# Patient Record
Sex: MALE | Race: WHITE | NOT HISPANIC OR LATINO | Employment: OTHER | URBAN - METROPOLITAN AREA
[De-identification: names, ages, dates, MRNs, and addresses within clinical notes are randomized per-mention and may not be internally consistent; named-entity substitution may affect disease eponyms.]

---

## 2021-01-10 DIAGNOSIS — Z11.59 ENCOUNTER FOR SCREENING FOR OTHER VIRAL DISEASES: Primary | ICD-10-CM

## 2021-01-29 DIAGNOSIS — Z11.59 ENCOUNTER FOR SCREENING FOR OTHER VIRAL DISEASES: ICD-10-CM

## 2021-01-29 LAB
SARS-COV-2 RNA RESP QL NAA+PROBE: NORMAL
SPECIMEN SOURCE: NORMAL

## 2021-01-29 PROCEDURE — U0005 INFEC AGEN DETEC AMPLI PROBE: HCPCS | Performed by: ORTHOPAEDIC SURGERY

## 2021-01-29 PROCEDURE — U0003 INFECTIOUS AGENT DETECTION BY NUCLEIC ACID (DNA OR RNA); SEVERE ACUTE RESPIRATORY SYNDROME CORONAVIRUS 2 (SARS-COV-2) (CORONAVIRUS DISEASE [COVID-19]), AMPLIFIED PROBE TECHNIQUE, MAKING USE OF HIGH THROUGHPUT TECHNOLOGIES AS DESCRIBED BY CMS-2020-01-R: HCPCS | Performed by: ORTHOPAEDIC SURGERY

## 2021-01-29 RX ORDER — ESCITALOPRAM OXALATE 10 MG/1
10 TABLET ORAL EVERY MORNING
COMMUNITY

## 2021-01-29 RX ORDER — HYDROXYZINE HYDROCHLORIDE 25 MG/1
25-50 TABLET, FILM COATED ORAL 2 TIMES DAILY PRN
COMMUNITY
End: 2021-01-29

## 2021-01-29 RX ORDER — LORAZEPAM 1 MG/1
1 TABLET ORAL
COMMUNITY

## 2021-01-29 RX ORDER — BETAMETHASONE DIPROPIONATE 0.5 MG/G
LOTION TOPICAL 2 TIMES DAILY PRN
COMMUNITY

## 2021-01-29 RX ORDER — PHENOL 1.4 %
10 AEROSOL, SPRAY (ML) MUCOUS MEMBRANE AT BEDTIME
COMMUNITY

## 2021-01-29 RX ORDER — SILDENAFIL CITRATE 20 MG/1
60-100 TABLET ORAL DAILY PRN
COMMUNITY

## 2021-01-29 RX ORDER — HYDROCORTISONE 2.5 %
CREAM (GRAM) TOPICAL 2 TIMES DAILY PRN
COMMUNITY

## 2021-01-29 RX ORDER — BUDESONIDE 3 MG/1
9 CAPSULE, COATED PELLETS ORAL EVERY MORNING
COMMUNITY

## 2021-01-29 RX ORDER — CLOTRIMAZOLE 1 G/ML
SOLUTION TOPICAL 2 TIMES DAILY PRN
Status: ON HOLD | COMMUNITY
End: 2021-02-01

## 2021-01-29 RX ORDER — ROSUVASTATIN CALCIUM 40 MG/1
40 TABLET, COATED ORAL AT BEDTIME
COMMUNITY

## 2021-01-29 RX ORDER — TRAZODONE HYDROCHLORIDE 50 MG/1
50 TABLET, FILM COATED ORAL
COMMUNITY
End: 2021-01-29

## 2021-01-29 RX ORDER — VENLAFAXINE HYDROCHLORIDE 37.5 MG/1
75 CAPSULE, EXTENDED RELEASE ORAL
COMMUNITY

## 2021-01-29 RX ORDER — METRONIDAZOLE 7.5 MG/G
GEL TOPICAL 2 TIMES DAILY PRN
COMMUNITY

## 2021-01-29 RX ORDER — NITROGLYCERIN 0.4 MG/1
0.4 TABLET SUBLINGUAL DAILY PRN
COMMUNITY

## 2021-01-29 NOTE — PROGRESS NOTES
PTA medications updated by Medication Scribe prior to surgery via phone call with patient      -LAST DOSES ENTERED BY NURSE-    Comments:    Medication history sources: Patient and H&P  Medication history source reliability: Good  Adherence assessment: N/A Not Observed    Significant changes made to the medication list:  Patient reports no longer taking the following meds (med scribe removed from PTA med list): hydroxyzine, Trazodone      Additional medication history information:   Patient brought own home meds: Diprosone 0.05% lotion, hydrocortisone 2.5% cream, Metronidazole 0.75% gel        Prior to Admission medications    Medication Sig Last Dose Taking? Auth Provider   betamethasone dipropionate (DIPROSONE) 0.05 % external lotion Apply topically 2 times daily as needed (to face.)   at PRN Yes Reported, Patient   budesonide (ENTOCORT EC) 3 MG EC capsule Take 9 mg by mouth every morning (3 X 3 mg)  at AM Yes Reported, Patient   clotrimazole (LOTRIMIN) 1 % external solution Apply topically 2 times daily as needed   at PRN Yes Reported, Patient   escitalopram (LEXAPRO) 10 MG tablet Take 10 mg by mouth every morning  at AM Yes Reported, Patient   hydrocortisone 2.5 % cream Apply topically 2 times daily as needed for itching (to face)   at PRN Yes Reported, Patient   LORazepam (ATIVAN) 1 MG tablet Take 1 mg by mouth 2 times daily   at AM Yes Reported, Patient   Melatonin 10 MG TABS tablet Take 10 mg by mouth At Bedtime   at HS Yes Reported, Patient   metroNIDAZOLE (METROGEL) 0.75 % external gel Apply topically 2 times daily as needed (to face)   at PRN Yes Reported, Patient   nitroGLYcerin (NITROSTAT) 0.4 MG sublingual tablet Place 0.4 mg under the tongue daily as needed for chest pain For chest pain place 1 tablet under the tongue every 5 minutes for 3 doses. If symptoms persist 5 minutes after 1st dose call 911.  at PRN Yes Reported, Patient   rosuvastatin (CRESTOR) 40 MG tablet Take 40 mg by mouth At Bedtime  at  HS Yes Reported, Patient   sildenafil (REVATIO) 20 MG tablet Take  mg by mouth daily as needed  at PRN Yes Reported, Patient   venlafaxine (EFFEXOR-XR) 37.5 MG 24 hr capsule Take 75 mg by mouth 2 times daily (2 X 37.5 mg)   at AM Yes Reported, Patient

## 2021-01-30 LAB
LABORATORY COMMENT REPORT: NORMAL
SARS-COV-2 RNA RESP QL NAA+PROBE: NEGATIVE
SPECIMEN SOURCE: NORMAL

## 2021-02-01 ENCOUNTER — ANESTHESIA EVENT (OUTPATIENT)
Dept: SURGERY | Facility: CLINIC | Age: 76
End: 2021-02-01
Payer: MEDICARE

## 2021-02-01 ENCOUNTER — APPOINTMENT (OUTPATIENT)
Dept: GENERAL RADIOLOGY | Facility: CLINIC | Age: 76
End: 2021-02-01
Attending: ORTHOPAEDIC SURGERY
Payer: MEDICARE

## 2021-02-01 ENCOUNTER — ANESTHESIA (OUTPATIENT)
Dept: SURGERY | Facility: CLINIC | Age: 76
End: 2021-02-01
Payer: MEDICARE

## 2021-02-01 ENCOUNTER — HOSPITAL ENCOUNTER (OUTPATIENT)
Facility: CLINIC | Age: 76
Discharge: HOME | End: 2021-02-02
Attending: ORTHOPAEDIC SURGERY | Admitting: ORTHOPAEDIC SURGERY
Payer: MEDICARE

## 2021-02-01 DIAGNOSIS — Z96.662 STATUS POST LEFT ANKLE JOINT REPLACEMENT: Primary | ICD-10-CM

## 2021-02-01 PROBLEM — Z96.669 S/P ANKLE JOINT REPLACEMENT: Status: ACTIVE | Noted: 2021-02-01

## 2021-02-01 LAB
CREAT SERPL-MCNC: 0.79 MG/DL (ref 0.66–1.25)
GFR SERPL CREATININE-BSD FRML MDRD: 87 ML/MIN/{1.73_M2}

## 2021-02-01 PROCEDURE — 250N000011 HC RX IP 250 OP 636: Performed by: ANESTHESIOLOGY

## 2021-02-01 PROCEDURE — 999N000141 HC STATISTIC PRE-PROCEDURE NURSING ASSESSMENT: Performed by: ORTHOPAEDIC SURGERY

## 2021-02-01 PROCEDURE — 258N000003 HC RX IP 258 OP 636: Performed by: PHYSICIAN ASSISTANT

## 2021-02-01 PROCEDURE — 250N000011 HC RX IP 250 OP 636: Performed by: PHYSICIAN ASSISTANT

## 2021-02-01 PROCEDURE — C1776 JOINT DEVICE (IMPLANTABLE): HCPCS | Performed by: ORTHOPAEDIC SURGERY

## 2021-02-01 PROCEDURE — 258N000003 HC RX IP 258 OP 636: Performed by: NURSE ANESTHETIST, CERTIFIED REGISTERED

## 2021-02-01 PROCEDURE — 360N000084 HC SURGERY LEVEL 4 W/ FLUORO, PER MIN: Performed by: ORTHOPAEDIC SURGERY

## 2021-02-01 PROCEDURE — 250N000009 HC RX 250: Performed by: NURSE ANESTHETIST, CERTIFIED REGISTERED

## 2021-02-01 PROCEDURE — C1713 ANCHOR/SCREW BN/BN,TIS/BN: HCPCS | Performed by: ORTHOPAEDIC SURGERY

## 2021-02-01 PROCEDURE — 271N000001 HC OR GENERAL SUPPLY NON-STERILE: Performed by: ORTHOPAEDIC SURGERY

## 2021-02-01 PROCEDURE — 250N000013 HC RX MED GY IP 250 OP 250 PS 637: Mod: GY | Performed by: PHYSICIAN ASSISTANT

## 2021-02-01 PROCEDURE — 250N000025 HC SEVOFLURANE, PER MIN: Performed by: ORTHOPAEDIC SURGERY

## 2021-02-01 PROCEDURE — 272N000001 HC OR GENERAL SUPPLY STERILE: Performed by: ORTHOPAEDIC SURGERY

## 2021-02-01 PROCEDURE — 258N000003 HC RX IP 258 OP 636: Performed by: ANESTHESIOLOGY

## 2021-02-01 PROCEDURE — 999N000179 XR SURGERY CARM FLUORO LESS THAN 5 MIN W STILLS

## 2021-02-01 PROCEDURE — 82565 ASSAY OF CREATININE: CPT | Performed by: ORTHOPAEDIC SURGERY

## 2021-02-01 PROCEDURE — 370N000017 HC ANESTHESIA TECHNICAL FEE, PER MIN: Performed by: ORTHOPAEDIC SURGERY

## 2021-02-01 PROCEDURE — 36415 COLL VENOUS BLD VENIPUNCTURE: CPT | Performed by: ORTHOPAEDIC SURGERY

## 2021-02-01 PROCEDURE — 250N000013 HC RX MED GY IP 250 OP 250 PS 637: Performed by: ORTHOPAEDIC SURGERY

## 2021-02-01 PROCEDURE — 250N000011 HC RX IP 250 OP 636: Performed by: NURSE ANESTHETIST, CERTIFIED REGISTERED

## 2021-02-01 PROCEDURE — 710N000009 HC RECOVERY PHASE 1, LEVEL 1, PER MIN: Performed by: ORTHOPAEDIC SURGERY

## 2021-02-01 PROCEDURE — 250N000009 HC RX 250: Performed by: ORTHOPAEDIC SURGERY

## 2021-02-01 PROCEDURE — 272N000002 HC OR SUPPLY OTHER OPNP: Performed by: ORTHOPAEDIC SURGERY

## 2021-02-01 DEVICE — IMP INSERT TORNIER TIBIAL ANKLE SIZE 2X8MM LT LJU265: Type: IMPLANTABLE DEVICE | Site: ANKLE | Status: FUNCTIONAL

## 2021-02-01 DEVICE — IMP BASEPLATE TIBIAL ANKLE XL SIZE 2 SALTO LJU992T: Type: IMPLANTABLE DEVICE | Site: ANKLE | Status: FUNCTIONAL

## 2021-02-01 DEVICE — IMP COMP TORNIER TALAR ANKLE SIZE 2 LT LJU212: Type: IMPLANTABLE DEVICE | Site: ANKLE | Status: FUNCTIONAL

## 2021-02-01 RX ORDER — CEFAZOLIN SODIUM 1 G/3ML
1 INJECTION, POWDER, FOR SOLUTION INTRAMUSCULAR; INTRAVENOUS SEE ADMIN INSTRUCTIONS
Status: DISCONTINUED | OUTPATIENT
Start: 2021-02-01 | End: 2021-02-01 | Stop reason: DRUGHIGH

## 2021-02-01 RX ORDER — ONDANSETRON 2 MG/ML
INJECTION INTRAMUSCULAR; INTRAVENOUS PRN
Status: DISCONTINUED | OUTPATIENT
Start: 2021-02-01 | End: 2021-02-01

## 2021-02-01 RX ORDER — HYDROCORTISONE 2.5 %
CREAM (GRAM) TOPICAL 2 TIMES DAILY PRN
Status: DISCONTINUED | OUTPATIENT
Start: 2021-02-01 | End: 2021-02-02 | Stop reason: HOSPADM

## 2021-02-01 RX ORDER — MAGNESIUM HYDROXIDE 1200 MG/15ML
LIQUID ORAL PRN
Status: DISCONTINUED | OUTPATIENT
Start: 2021-02-01 | End: 2021-02-01 | Stop reason: HOSPADM

## 2021-02-01 RX ORDER — EPHEDRINE SULFATE 50 MG/ML
INJECTION, SOLUTION INTRAMUSCULAR; INTRAVENOUS; SUBCUTANEOUS PRN
Status: DISCONTINUED | OUTPATIENT
Start: 2021-02-01 | End: 2021-02-01

## 2021-02-01 RX ORDER — LIDOCAINE 40 MG/G
CREAM TOPICAL
Status: DISCONTINUED | OUTPATIENT
Start: 2021-02-01 | End: 2021-02-02 | Stop reason: HOSPADM

## 2021-02-01 RX ORDER — HYDROMORPHONE HYDROCHLORIDE 1 MG/ML
.3-.5 INJECTION, SOLUTION INTRAMUSCULAR; INTRAVENOUS; SUBCUTANEOUS EVERY 10 MIN PRN
Status: DISCONTINUED | OUTPATIENT
Start: 2021-02-01 | End: 2021-02-01

## 2021-02-01 RX ORDER — DEXAMETHASONE SODIUM PHOSPHATE 4 MG/ML
INJECTION, SOLUTION INTRA-ARTICULAR; INTRALESIONAL; INTRAMUSCULAR; INTRAVENOUS; SOFT TISSUE PRN
Status: DISCONTINUED | OUTPATIENT
Start: 2021-02-01 | End: 2021-02-01

## 2021-02-01 RX ORDER — NALOXONE HYDROCHLORIDE 0.4 MG/ML
0.2 INJECTION, SOLUTION INTRAMUSCULAR; INTRAVENOUS; SUBCUTANEOUS
Status: ACTIVE | OUTPATIENT
Start: 2021-02-01 | End: 2021-02-02

## 2021-02-01 RX ORDER — ONDANSETRON 4 MG/1
4 TABLET, ORALLY DISINTEGRATING ORAL EVERY 6 HOURS PRN
Status: DISCONTINUED | OUTPATIENT
Start: 2021-02-01 | End: 2021-02-02 | Stop reason: HOSPADM

## 2021-02-01 RX ORDER — METRONIDAZOLE 7.5 MG/G
GEL TOPICAL 2 TIMES DAILY PRN
Status: DISCONTINUED | OUTPATIENT
Start: 2021-02-01 | End: 2021-02-02 | Stop reason: HOSPADM

## 2021-02-01 RX ORDER — ONDANSETRON 2 MG/ML
4 INJECTION INTRAMUSCULAR; INTRAVENOUS EVERY 6 HOURS PRN
Status: DISCONTINUED | OUTPATIENT
Start: 2021-02-01 | End: 2021-02-02 | Stop reason: HOSPADM

## 2021-02-01 RX ORDER — KETOROLAC TROMETHAMINE 15 MG/ML
15 INJECTION, SOLUTION INTRAMUSCULAR; INTRAVENOUS EVERY 6 HOURS
Status: COMPLETED | OUTPATIENT
Start: 2021-02-01 | End: 2021-02-02

## 2021-02-01 RX ORDER — SODIUM CHLORIDE, SODIUM LACTATE, POTASSIUM CHLORIDE, CALCIUM CHLORIDE 600; 310; 30; 20 MG/100ML; MG/100ML; MG/100ML; MG/100ML
INJECTION, SOLUTION INTRAVENOUS CONTINUOUS
Status: DISCONTINUED | OUTPATIENT
Start: 2021-02-01 | End: 2021-02-02 | Stop reason: HOSPADM

## 2021-02-01 RX ORDER — FENTANYL CITRATE 50 UG/ML
50-100 INJECTION, SOLUTION INTRAMUSCULAR; INTRAVENOUS
Status: DISCONTINUED | OUTPATIENT
Start: 2021-02-01 | End: 2021-02-01

## 2021-02-01 RX ORDER — VENLAFAXINE HYDROCHLORIDE 75 MG/1
75 CAPSULE, EXTENDED RELEASE ORAL
Status: DISCONTINUED | OUTPATIENT
Start: 2021-02-01 | End: 2021-02-02 | Stop reason: HOSPADM

## 2021-02-01 RX ORDER — HYDROXYZINE HYDROCHLORIDE 10 MG/1
10 TABLET, FILM COATED ORAL EVERY 6 HOURS PRN
Status: DISCONTINUED | OUTPATIENT
Start: 2021-02-01 | End: 2021-02-02 | Stop reason: HOSPADM

## 2021-02-01 RX ORDER — ROSUVASTATIN CALCIUM 20 MG/1
40 TABLET, COATED ORAL AT BEDTIME
Status: DISCONTINUED | OUTPATIENT
Start: 2021-02-01 | End: 2021-02-02 | Stop reason: HOSPADM

## 2021-02-01 RX ORDER — ONDANSETRON 4 MG/1
4 TABLET, ORALLY DISINTEGRATING ORAL EVERY 30 MIN PRN
Status: DISCONTINUED | OUTPATIENT
Start: 2021-02-01 | End: 2021-02-01 | Stop reason: DRUGHIGH

## 2021-02-01 RX ORDER — ASPIRIN 325 MG
325 TABLET ORAL DAILY
Status: DISCONTINUED | OUTPATIENT
Start: 2021-02-01 | End: 2021-02-02 | Stop reason: HOSPADM

## 2021-02-01 RX ORDER — CEFAZOLIN SODIUM 1 G/3ML
1 INJECTION, POWDER, FOR SOLUTION INTRAMUSCULAR; INTRAVENOUS EVERY 8 HOURS
Status: DISCONTINUED | OUTPATIENT
Start: 2021-02-01 | End: 2021-02-02 | Stop reason: HOSPADM

## 2021-02-01 RX ORDER — ACETAMINOPHEN 325 MG/1
650 TABLET ORAL EVERY 6 HOURS PRN
Status: DISCONTINUED | OUTPATIENT
Start: 2021-02-01 | End: 2021-02-02 | Stop reason: HOSPADM

## 2021-02-01 RX ORDER — LORAZEPAM 1 MG/1
1 TABLET ORAL
Status: DISCONTINUED | OUTPATIENT
Start: 2021-02-01 | End: 2021-02-02 | Stop reason: HOSPADM

## 2021-02-01 RX ORDER — OXYCODONE HYDROCHLORIDE 5 MG/1
5-10 TABLET ORAL
Status: DISCONTINUED | OUTPATIENT
Start: 2021-02-01 | End: 2021-02-02 | Stop reason: HOSPADM

## 2021-02-01 RX ORDER — PROCHLORPERAZINE MALEATE 5 MG
5 TABLET ORAL EVERY 6 HOURS PRN
Status: DISCONTINUED | OUTPATIENT
Start: 2021-02-01 | End: 2021-02-02 | Stop reason: HOSPADM

## 2021-02-01 RX ORDER — NALOXONE HYDROCHLORIDE 0.4 MG/ML
0.4 INJECTION, SOLUTION INTRAMUSCULAR; INTRAVENOUS; SUBCUTANEOUS
Status: ACTIVE | OUTPATIENT
Start: 2021-02-01 | End: 2021-02-02

## 2021-02-01 RX ORDER — FENTANYL CITRATE 50 UG/ML
25-50 INJECTION, SOLUTION INTRAMUSCULAR; INTRAVENOUS
Status: DISCONTINUED | OUTPATIENT
Start: 2021-02-01 | End: 2021-02-01

## 2021-02-01 RX ORDER — HYDROMORPHONE HYDROCHLORIDE 1 MG/ML
0.3 INJECTION, SOLUTION INTRAMUSCULAR; INTRAVENOUS; SUBCUTANEOUS
Status: DISCONTINUED | OUTPATIENT
Start: 2021-02-01 | End: 2021-02-02 | Stop reason: HOSPADM

## 2021-02-01 RX ORDER — BUDESONIDE 3 MG/1
9 CAPSULE, COATED PELLETS ORAL EVERY MORNING
Status: DISCONTINUED | OUTPATIENT
Start: 2021-02-02 | End: 2021-02-02 | Stop reason: HOSPADM

## 2021-02-01 RX ORDER — CEFAZOLIN SODIUM 2 G/100ML
2 INJECTION, SOLUTION INTRAVENOUS
Status: COMPLETED | OUTPATIENT
Start: 2021-02-01 | End: 2021-02-01

## 2021-02-01 RX ORDER — LIDOCAINE HYDROCHLORIDE 20 MG/ML
INJECTION, SOLUTION INFILTRATION; PERINEURAL PRN
Status: DISCONTINUED | OUTPATIENT
Start: 2021-02-01 | End: 2021-02-01

## 2021-02-01 RX ORDER — NITROGLYCERIN 0.4 MG/1
0.4 TABLET SUBLINGUAL DAILY PRN
Status: DISCONTINUED | OUTPATIENT
Start: 2021-02-01 | End: 2021-02-02 | Stop reason: HOSPADM

## 2021-02-01 RX ORDER — FENTANYL CITRATE 50 UG/ML
INJECTION, SOLUTION INTRAMUSCULAR; INTRAVENOUS PRN
Status: DISCONTINUED | OUTPATIENT
Start: 2021-02-01 | End: 2021-02-01

## 2021-02-01 RX ORDER — TRAZODONE HYDROCHLORIDE 50 MG/1
50 TABLET, FILM COATED ORAL AT BEDTIME
Status: ON HOLD | COMMUNITY
End: 2021-02-01

## 2021-02-01 RX ORDER — SODIUM CHLORIDE, SODIUM LACTATE, POTASSIUM CHLORIDE, CALCIUM CHLORIDE 600; 310; 30; 20 MG/100ML; MG/100ML; MG/100ML; MG/100ML
INJECTION, SOLUTION INTRAVENOUS CONTINUOUS
Status: DISCONTINUED | OUTPATIENT
Start: 2021-02-01 | End: 2021-02-01 | Stop reason: ALTCHOICE

## 2021-02-01 RX ORDER — BETAMETHASONE DIPROPIONATE 0.5 MG/G
LOTION TOPICAL 2 TIMES DAILY PRN
Status: DISCONTINUED | OUTPATIENT
Start: 2021-02-01 | End: 2021-02-02 | Stop reason: HOSPADM

## 2021-02-01 RX ORDER — FENTANYL CITRATE 0.05 MG/ML
25-50 INJECTION, SOLUTION INTRAMUSCULAR; INTRAVENOUS
Status: DISCONTINUED | OUTPATIENT
Start: 2021-02-01 | End: 2021-02-01 | Stop reason: HOSPADM

## 2021-02-01 RX ORDER — HYDROXYZINE HYDROCHLORIDE 25 MG/1
25 TABLET, FILM COATED ORAL 2 TIMES DAILY PRN
Status: ON HOLD | COMMUNITY
End: 2021-02-01

## 2021-02-01 RX ORDER — PROPOFOL 10 MG/ML
INJECTION, EMULSION INTRAVENOUS PRN
Status: DISCONTINUED | OUTPATIENT
Start: 2021-02-01 | End: 2021-02-01

## 2021-02-01 RX ORDER — ESCITALOPRAM OXALATE 10 MG/1
10 TABLET ORAL EVERY MORNING
Status: DISCONTINUED | OUTPATIENT
Start: 2021-02-02 | End: 2021-02-02 | Stop reason: HOSPADM

## 2021-02-01 RX ORDER — ONDANSETRON 2 MG/ML
4 INJECTION INTRAMUSCULAR; INTRAVENOUS EVERY 30 MIN PRN
Status: DISCONTINUED | OUTPATIENT
Start: 2021-02-01 | End: 2021-02-01 | Stop reason: DRUGHIGH

## 2021-02-01 RX ADMIN — LORAZEPAM 1 MG: 1 TABLET ORAL at 16:35

## 2021-02-01 RX ADMIN — PROPOFOL 120 MG: 10 INJECTION, EMULSION INTRAVENOUS at 08:00

## 2021-02-01 RX ADMIN — SUCCINYLCHOLINE CHLORIDE 100 MG: 20 INJECTION, SOLUTION INTRAMUSCULAR; INTRAVENOUS; PARENTERAL at 08:00

## 2021-02-01 RX ADMIN — ONDANSETRON 4 MG: 2 INJECTION INTRAMUSCULAR; INTRAVENOUS at 08:07

## 2021-02-01 RX ADMIN — DEXAMETHASONE SODIUM PHOSPHATE 4 MG: 4 INJECTION, SOLUTION INTRA-ARTICULAR; INTRALESIONAL; INTRAMUSCULAR; INTRAVENOUS; SOFT TISSUE at 08:08

## 2021-02-01 RX ADMIN — SODIUM CHLORIDE, POTASSIUM CHLORIDE, SODIUM LACTATE AND CALCIUM CHLORIDE: 600; 310; 30; 20 INJECTION, SOLUTION INTRAVENOUS at 07:45

## 2021-02-01 RX ADMIN — ROSUVASTATIN CALCIUM 40 MG: 20 TABLET, FILM COATED ORAL at 20:10

## 2021-02-01 RX ADMIN — KETOROLAC TROMETHAMINE 15 MG: 15 INJECTION, SOLUTION INTRAMUSCULAR; INTRAVENOUS at 20:10

## 2021-02-01 RX ADMIN — LIDOCAINE HYDROCHLORIDE 40 MG: 20 INJECTION, SOLUTION INFILTRATION; PERINEURAL at 08:00

## 2021-02-01 RX ADMIN — Medication 5 MG: at 08:18

## 2021-02-01 RX ADMIN — Medication 5 MG: at 08:25

## 2021-02-01 RX ADMIN — CEFAZOLIN 1 G: 1 INJECTION, POWDER, FOR SOLUTION INTRAMUSCULAR; INTRAVENOUS at 16:35

## 2021-02-01 RX ADMIN — KETOROLAC TROMETHAMINE 15 MG: 15 INJECTION, SOLUTION INTRAMUSCULAR; INTRAVENOUS at 13:02

## 2021-02-01 RX ADMIN — SODIUM CHLORIDE, POTASSIUM CHLORIDE, SODIUM LACTATE AND CALCIUM CHLORIDE: 600; 310; 30; 20 INJECTION, SOLUTION INTRAVENOUS at 08:31

## 2021-02-01 RX ADMIN — MIDAZOLAM HYDROCHLORIDE 1 MG: 1 INJECTION, SOLUTION INTRAMUSCULAR; INTRAVENOUS at 07:47

## 2021-02-01 RX ADMIN — FENTANYL CITRATE 50 MCG: 50 INJECTION, SOLUTION INTRAMUSCULAR; INTRAVENOUS at 08:00

## 2021-02-01 RX ADMIN — VENLAFAXINE HYDROCHLORIDE 75 MG: 75 CAPSULE, EXTENDED RELEASE ORAL at 16:35

## 2021-02-01 RX ADMIN — FENTANYL CITRATE 50 MCG: 50 INJECTION, SOLUTION INTRAMUSCULAR; INTRAVENOUS at 07:36

## 2021-02-01 RX ADMIN — ASPIRIN 325 MG ORAL TABLET 325 MG: 325 PILL ORAL at 13:02

## 2021-02-01 RX ADMIN — CEFAZOLIN SODIUM 2 G: 2 INJECTION, SOLUTION INTRAVENOUS at 08:04

## 2021-02-01 RX ADMIN — PHENYLEPHRINE HYDROCHLORIDE 50 MCG: 10 INJECTION INTRAVENOUS at 08:37

## 2021-02-01 RX ADMIN — Medication 5 MG: at 08:21

## 2021-02-01 RX ADMIN — Medication 1 MG: at 22:39

## 2021-02-01 SDOH — HEALTH STABILITY: MENTAL HEALTH: HOW OFTEN DO YOU HAVE 6 OR MORE DRINKS ON ONE OCCASION?: WEEKLY

## 2021-02-01 SDOH — HEALTH STABILITY: MENTAL HEALTH: HOW OFTEN DO YOU HAVE A DRINK CONTAINING ALCOHOL?: NOT ASKED

## 2021-02-01 SDOH — HEALTH STABILITY: MENTAL HEALTH: HOW MANY STANDARD DRINKS CONTAINING ALCOHOL DO YOU HAVE ON A TYPICAL DAY?: 3 OR 4

## 2021-02-01 ASSESSMENT — MIFFLIN-ST. JEOR: SCORE: 1520.08

## 2021-02-01 ASSESSMENT — LIFESTYLE VARIABLES: TOBACCO_USE: 0

## 2021-02-01 NOTE — ANESTHESIA POSTPROCEDURE EVALUATION
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    Anesthesia Type:  General    Note:  Disposition: Admission   Postop Pain Control: Uneventful            Sign Out: Well controlled pain   PONV: No   Neuro/Psych: Uneventful            Sign Out: Acceptable/Baseline neuro status   Airway/Respiratory: Uneventful            Sign Out: Acceptable/Baseline resp. status   CV/Hemodynamics: Uneventful            Sign Out: Acceptable CV status   Other NRE: NONE   DID A NON-ROUTINE EVENT OCCUR? No         Last vitals:  Vitals:    02/01/21 1045 02/01/21 1110 02/01/21 1140   BP: (!) 146/81 130/78 131/74   Pulse: 73 73 67   Resp: 10 17 14   Temp: 36.5  C (97.7  F) 36.7  C (98  F)    SpO2: 96% 95% 96%       Electronically Signed By: Kwadwo Blum MD  February 1, 2021  12:06 PM

## 2021-02-01 NOTE — ANESTHESIA PROCEDURE NOTES
Pre-Procedure   Staff -   Anesthesiologist:  Kwadwo Blum MD  Performed By: anesthesiologist  Location: pre-op  Pre-Anesthestic Checklist: patient identified, IV checked, site marked, risks and benefits discussed, informed consent, monitors and equipment checked, pre-op evaluation, at physician/surgeon's request and post-op pain management  Timeout:  Correct Patient: Yes   Correct Procedure: Yes   Correct Site: Yes   Correct Position: Yes   Correct Laterality: Yes   Site Marked: Yes    Procedure Documentation  Procedure: Sciatic  Patient Position:supine  Patient Prep/Sterile Barriers: sterile gloves, mask, Chloraprep  Local skin infiltrated with 2 mL of 1% lidocaine.   Needle type: insulated and short bevel  Needle Gauge: 21.   Needle Length (millimeters) 90   Ultrasound guided, Ultrasound used to identify targeted nerve, plexus, vascular marker, or fascial plane and place a needle adjacent to it in real-time, Ultrasound was used to visualize the spread of anesthetic in close proximity to the above referenced structure  A permanent image is entered into the patient's record.    Assessment/Narrative      The placement was negative for: blood aspirated, painful injection and site bleeding  Paresthesias: No.  Bolus given via needle..   Secured via.   Insertion/Infusion Method: Single Shot  Complications: none  Injection made incrementally with aspirations every 5 mL.  Comments:    No immediate complications.  Patient tolerated well.     Ultrasound Interpretation, Peripheral Nerve Block    1. Under ultrasound guidance, the needle was inserted and placed in close proximity to the target nerve(s).  2. Ultrasound was also used to visualize the spread of the anesthetic in close proximity to the nerve(s) being blocked.  Local anesthetic was administered in incremental doses, with intermittent negative aspiration.    3. The nerve(s) appeared anatomically normal.  4. There were no apparent abnormal pathological  findings.  5. A permanent ultrasound image was saved in the patient's record.    Pt tolerated well.    No complications.      The surgeon has given a verbal order transferring care of this patient to me for the performance of a regional analgesia block for post-op pain control. It is requested of me because I am uniquely trained and qualified to perform this block and the surgeon is neither trained nor qualified to perform this procedure.

## 2021-02-01 NOTE — ANESTHESIA PREPROCEDURE EVALUATION
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]   Procedure : Procedure(s):  LEFT TOTAL ANKLE     Past Medical History:   Diagnosis Date     Acid reflux      Anxiety      Bronchitis      CAD (coronary artery disease)      Dermatitis      ED (erectile dysfunction)      Impaired fasting blood sugar      Lethargy     post traumatic     Malaise and fatigue      Malignant neoplasm of urinary bladder, unspecified site (H)      Neuropathy      Nodular lymphoma (H)      JOY (obstructive sleep apnea)     no cpap, lost alot of weight so not needed anymore     Prostate cancer (H)      Right foot pain      Scoliosis      Seasonal affective disorder (H)      Sleep disorder       Past Surgical History:   Procedure Laterality Date     BYPASS GRAFT ARTERY CORONARY       CHOLECYSTECTOMY       COLONOSCOPY       CYSTOSCOPY, TRANSURETHRAL RESECTION (TUR) TUMOR BLADDER, COMBINED       metatarsal phalangeal joint arthrodesis Right     foot     PROSTATECTOMY RETROPUBIC RADICAL       wisdom teeth        Allergies   Allergen Reactions     Latex Other (See Comments)     Skin went raw.  Retired dentist     Clindamycin Rash      Social History     Tobacco Use     Smoking status: Never Smoker     Smokeless tobacco: Never Used   Substance Use Topics     Alcohol use: Yes     Drinks per session: 3 or 4     Binge frequency: Weekly      Wt Readings from Last 1 Encounters:   21 77.9 kg (171 lb 11.2 oz)        Anesthesia Evaluation            ROS/MED HX  ENT/Pulmonary:     (+) sleep apnea,  (-) tobacco use and asthma   Neurologic:     (+) peripheral neuropathy,     Cardiovascular: Comment: This result has an attachment that is not available.  Result Narrative   [facility]   [address] [place] [address] [address], [place] [address]   Main: [phone] [email or web address]                                                   Myocardial Perfusion Report                    Rest/Stress 1 Day Single Isotope Gated SPECT imaging with Ghassan exercise   stress       [facility] LOPEZ ID: [id number] Age: 75 : 1945 Nuclear Tech: LAB   Exam Date: 2020 12:10 Gender: M RN/Ex. Physiologist: BENJAMIN   Accession #: [id number] Height: 68.9 in BSA: 1.94 m  Monitoring Provider: STEFNA HATFIELD   Weight: 174 lbs BMI: 25.8 kg/m  Ordering Provider: URMILA HOLBROOK   Location: [facility]   Indication: Stable angina pectoris (HC)     FINAL CONCLUSIONS   1-Submaximal exercise nuclear stress test.  The stress perfusion images are normal with likely   attenuation artifact in the apical inferior   segment on resting study.  However, patient only reached 81% of maximum predicted heart rate   which limits the sensitivity to exclude   significant coronary artery disease.   2-Positive ECG evidence of ischemia with the development of 1 mm of ST depression noted in 3,   avF and V4-6 leads.   3-Exercise did not seem to reproduce presenting chest pain or symptoms.   4-Normal left ventricular wall motion.   5-Normal left ventricular size and systolic function with a calculated LVEF of >70%.   6-Good exercise tolerance, achieving 10.9 METs and 81.4% of max predicted heart rate.     There are no prior studies available for comparison.         PATIENT HISTORY Known CAD   Risk Factors: Hyperlipidemia   Cardiac History: Coronary artery disease, Previous CABG, Cardiac arrest prior to CABG   Presenting  Symptoms: Atypical chest pain, Lightheadedness   Cardiac Meds: Nitrostat. Crestor. Fish Oil.   Meds past 24 hrs: Same as list above     STRESS TEST SUMMARY Exercise   Protocol: Ghassan Duration (m:s): 09:53 METs: 10.9 161 % Predicted Exercise Capacity   Angina Score: 0: no angina Max ST Deviation (mm): 0 Duke TM Score: 9.88   Resting HR (bpm): 70 Resting BP(mmHg): 128 / 68 Position: Sitting    Resting HR(bpm): 74 Resting BP(mmHg): 136 / 70 Position: Standing   Peak HR(bpm): 118 Peak BP(mmHg): 180 / 50 % MPHR: 81 Double Product: 81268   MPHR: 145 Target HR(bpm): 123   Recovery HR(bpm): 76 Recovery BP(mmHg): 126 / 60   BP Response: Normal HR Response: Normal   Stress Termination: Dyspnea   Stress Symptoms: Exercise produced shortness of breath. Exercise did not seem to reproduce   presenting chest pain or symptoms.   Meds Given: NONE     ECG   Resting ECG: Normal resting ECG with sinus rhythm and normal conduction. Early repolarization.   Resting Arrhythmia: Atrial couplet   Stress ECG: Positive ECG evidence of ischemia with the development of 1 mm of ST depression   noted in 3, avF and V4-6   leads.   Stress Arrhythmia: Rare APCs, Rare atrial couplet     IMAGE PROTOCOL Rest/Stress 1 Day   Radiopharmaceutical Dose (mCi) Route Injection Time Injection Date Inj to Img Time (min)   Administered By   Rest: 99mTc Tetrofosmin 8.1 IV 1210 11-Aug-2020 35 PMW   Stress: 99mTc Tetrofosmin 24.8 IV 1318 11-Aug-2020 30 LAB   Post-Injection Exercise: An additional 1 minutes of exercise followed the intravenous   injection         PERFUSION RESULTS   Technical Quality: Adequate   Raw Data Analysis: Delay imaging was performed.          Post-Stress Perfusion   Rest Perfusion    Basal Anterior: 0 Basal Anterior: 0   Basal Anteroseptal: 0 Basal Anteroseptal: 0   Basal Inferoseptal: 0 Basal Inferoseptal: 0   Basal Inferior: 0 Basal Inferior: 0   Basal Inferolateral: 0 Basal Inferolateral: 0   Basal Anterolateral: 0 Basal Anterolateral: 0   Mid Anterior: 0 Mid Anterior: 0   Mid Anteroseptal: 0 Mid Anteroseptal: 0   Mid Inferoseptal: 0 Mid Inferoseptal: 1   Mid Inferior: 0 Mid Inferior: 2   Mid Inferolateral: 0 Mid Inferolateral: 0   Mid Anterolateral: 0 Mid Anterolateral: 0   Apical Anterior: 0 Apical Anterior: 2   Apical Septal: 0 Apical Septal: 1   Apical Inferior: 0 Apical Inferior: 1   Apical Lateral: 0 Apical Lateral: 0    [place]: 0 [place]: 0       Summed Stress Score: 0 Summed Rest Score: 7 Summed Difference Score: -7   0 - Normal                                           2 - Moderately Reduced Uptake                     4 - Absent   1 - Mildly  Reduced Uptake             3 - Severely Reduced Uptake                        X -   Not Interpretable     Perfusion: The stress perfusion images are normal with likely attenuation artifact in the   apical inferior segment on resting   study.  However, patient only reached 81% of maximum predicted heart rate which limits the   sensitivity to exclude   significant coronary artery disease.     Right Ventricle: The right ventricle was not visualized.   FUNCTION Calculated via Gated SPECT       Post Stress LV EF: 71 %   EDV: 55 ml   EDVI: 28 ml/m    ESV: 16 ml   ESVI: 8 ml/m      LV Size and Function: Normal left ventricular size and systolic function with a calculated   LVEF of >70%.       LV Regional Function: Normal left ventricular wall motion.           Khoi Greenwood MD [facility] Accredited Site   (Electronically Signed)   Final Date: 11 August 2020 16:40       ICD-10 Codes: I20.8   CC Providers:        (+) Dyslipidemia hypertension--CAD -CABG-- (-) CHF   METS/Exercise Tolerance:     Hematologic:  - neg hematologic  ROS     Musculoskeletal:   (+) arthritis,     GI/Hepatic:     (+) GERD, Symptomatic,     Renal/Genitourinary:  - neg Renal ROS     Endo:       Psychiatric/Substance Use:       Infectious Disease:       Malignancy:       Other:            Physical Exam    Airway        Mallampati: II   TM distance: > 3 FB   Neck ROM: full   Mouth opening: > 3 cm    Respiratory Devices and Support         Dental  no notable dental history         Cardiovascular   cardiovascular exam normal          Pulmonary   pulmonary exam normal                OUTSIDE LABS:  CBC: No results found for: WBC, HGB, HCT, PLT  BMP: No results found for: NA, POTASSIUM, CHLORIDE, CO2, BUN, CR, GLC  COAGS: No results found  for: PTT, INR, FIBR  POC: No results found for: BGM, HCG, HCGS  HEPATIC: No results found for: ALBUMIN, PROTTOTAL, ALT, AST, GGT, ALKPHOS, BILITOTAL, BILIDIRECT, MEREDITH  OTHER: No results found for: PH, LACT, A1C, MELISSA, PHOS, MAG, LIPASE, AMYLASE, TSH, T4, T3, CRP, SED    Anesthesia Plan     History & Physical Review      ASA Status:  3.   Plan for General with Intravenous and Propofol induction. Device: ETT Maintenance will be Balanced.         PONV prophylaxis:  Ondansetron (or other 5HT-3) and Dexamethasone or Solumedrol.       Consents  Anesthesia Plan(s) and associated risks, benefits, and realistic alternatives discussed.    Questions answered and patient/representative(s) expressed understanding.    Discussed with:  Patient.     Specific Concerns: R/B/A PNB.        Postoperative Care  Postoperative pain management: IV analgesics, Oral pain medications and Peripheral nerve block (Single Shot).           Kwadwo Blum MD

## 2021-02-01 NOTE — ANESTHESIA PROCEDURE NOTES
Airway   Date/Time: 2/1/2021 8:04 AM   Patient location during procedure: OR ([facility] - Operating Room or Procedural Area)  Staff -   Anesthesiologist:  Kwadwo Blum MD  CRNA: Linsey Buckley APRN CRNA  Other Anesthesia Staff: Fariba Holliday  Performed By: CHAD, with CRNAs and CRNA    Consent for Airway   Urgency: elective    Indications and Patient Condition  Indications for airway management: donnie-procedural  Induction type:intravenousMask difficulty assessment: 0 - not attempted    Final Airway Details  Final airway type: endotracheal airway  Successful airway:ETT - single  Endotracheal Airway Details   ETT size (mm): 8.0  Cuffed: yes  Successful intubation technique: direct laryngoscopy  Grade View of Cords: 1  Adjucts: stylet  Measured from: gums/teeth  Secured at (cm): 23  Secured with: pink tape and commercial tube rey  Bite block used: None    Post intubation assessment   Number of attempts at approach: 2 (CHAD DL x1 no view, CRNA DL x1 grade 1 view)  Number of other approaches attempted: 0  Secured with:pink tape and commercial tube rey  Ease of procedure: easy  Dentition: Intact and Unchanged

## 2021-02-01 NOTE — ANESTHESIA CARE TRANSFER NOTE
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    Anesthesia Type:   General     Note:    Oropharynx: spontaneously breathing  Level of Consciousness: awake  Oxygen Supplementation: face mask  Level of Supplemental Oxygen: 6  Independent Airway: airway patency satisfactory and stable  Dentition: dentition unchanged  Vital Signs Stable: post-procedure vital signs reviewed and stable  Report to RN Given: handoff report given  Patient transferred to: PACU  Comments: Neuromuscular blockade not used after succinylcholine for intubation, spontaneous return of TOF 4/4 with sustained tetany, spontaneous respirations, oropharynx suctioned with soft flexible catheter, airway patent after extubation.  Oxygen via facemask at 6 liters per minute to PACU. After extubation, patient remained in OR for 14 minutes until transport to PACU due to standard hospital COVID-19 precautions, Oxygen tubing connected to wall O2 in PACU, SpO2, NiBP, and EKG monitors and alarms on and functioning, report on patient's clinical status given to PACU RN, RN questions answered.     Handoff Report: Identifed the Patient, Identified the Reponsible Provider, Reviewed the pertinent medical history, Discussed the surgical course, Reviewed Intra-OP anesthesia mangement and issues during anesthesia, Set expectations for post-procedure period and Allowed opportunity for questions and acknowledgement of understanding      Vitals: (Last set prior to Anesthesia Care Transfer)  CRNA VITALS  2/1/2021 0846 - 2/1/2021 0921      2/1/2021             SpO2:  96 %    Resp Rate (observed): 10    Resp Rate (set):  10        Electronically Signed By: BUCKY Ley CRNA  February 1, 2021  9:21 AM

## 2021-02-01 NOTE — LETTER
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      Discharge Plan:  Home with home PT f/u with [facility] Dr. Reyes     Concern for non-adherence with plan of care:   Y/N yes the pt keeps bearing full weight on his surgical limb  Discharge Needs Assessment:  Needs      Most Recent Value   Equipment Currently Used at Home  none   # of Referrals Placed by CTS  Homecare            Follow-up specialty is recommended: Yes [facility] post op    Follow-up plan:  No future appointments.    Any outstanding tests or procedures:        Referrals     Future Labs/Procedures    Home Care PT Referral for Hospital Discharge     Comments:    PT to eval and treat      NOTE:  Your doctor has ordered home care to help you after your hospital stay. The staff will contact you to schedule your first visit. This service will be provided by [facility]. If you have any question, or have not received a call within 48 hours of discharge, please call them at [phone] or [phone].  *please see homecare quality ratings for all homecares in your area at www.medicare.gov      Your provider has ordered home care - physical therapy. If you have not been contacted within 2 days of your discharge please call the department phone  number listed on the top of this document.            Key Recommendations:  Sending this Handoff due to Bedside RN's and PT concerns as stated above.  Thank you,  Care Coordination    Alix Wylie RN    AVS/Discharge Summary is the source of truth; this is a helpful guide for improved communication of patient story

## 2021-02-01 NOTE — ANESTHESIA PROCEDURE NOTES
Pre-Procedure   Staff -   Anesthesiologist:  Kwadwo Blum MD  Performed By: anesthesiologist  Location: pre-op  Pre-Anesthestic Checklist: patient identified, IV checked, site marked, risks and benefits discussed, informed consent, monitors and equipment checked, pre-op evaluation, at physician/surgeon's request and post-op pain management  Timeout:  Correct Patient: Yes   Correct Procedure: Yes   Correct Site: Yes   Correct Position: Yes   Correct Laterality: Yes   Site Marked: Yes    Procedure Documentation  Procedure: Femoral  Laterality: right  Patient Position:supine  Patient Prep/Sterile Barriers: sterile gloves, mask, Chloraprep  Local skin infiltrated with 2 mL of 1% lidocaine.   Needle type: insulated and short bevel  Needle Gauge: 21.   Needle Length (millimeters) 90   Ultrasound guided, Ultrasound used to identify targeted nerve, plexus, vascular marker, or fascial plane and place a needle adjacent to it in real-time, Ultrasound was used to visualize the spread of anesthetic in close proximity to the above referenced structure  A permanent image is entered into the patient's record.    Assessment/Narrative      The placement was negative for: blood aspirated, painful injection and site bleeding  Paresthesias: No.  Bolus given via needle..   Secured via.   Insertion/Infusion Method: Single Shot  Complications: none  Injection made incrementally with aspirations every 5 mL.  Comments:  Femoral nerve block in adductor canal.  No immediate complications.  Patient tolerated well.     Ultrasound Interpretation, Peripheral Nerve Block    1. Under ultrasound guidance, the needle was inserted and placed in close proximity to the target nerve(s).  2. Ultrasound was also used to visualize the spread of the anesthetic in close proximity to the nerve(s) being blocked.  Local anesthetic was administered in incremental doses, with intermittent negative aspiration.    3. The nerve(s) appeared anatomically  normal.  4. There were no apparent abnormal pathological findings.  5. A permanent ultrasound image was saved in the patient's record.    Pt tolerated well.    No complications.      The surgeon has given a verbal order transferring care of this patient to me for the performance of a regional analgesia block for post-op pain control. It is requested of me because I am uniquely trained and qualified to perform this block and the surgeon is neither trained nor qualified to perform this procedure.

## 2021-02-01 NOTE — PROGRESS NOTES
Patient vital signs are at baseline: Yes  Patient able to ambulate as they were prior to admission or with assist devices provided by therapies during their stay:  No,  Reason:  PT scheduled tomorrow, dangled in bed  Patient MUST void prior to discharge:  Yes  Patient able to tolerate oral intake:  Yes  Pain has adequate pain control using Oral analgesics:  Denies pain at this time

## 2021-02-01 NOTE — OP NOTE
Procedure Date: 02/01/2021      PREOPERATIVE DIAGNOSIS:  Advanced degenerative changes of the left  ankle.      POSTOPERATIVE DIAGNOSIS:  Advanced degenerative changes of the left ankle.      PROCEDURE:  Left total ankle replacement using a Sona Talaris size 2 long base tibia, size 2 talus and an 8 mm polyethylene spacer.      ANESTHESIA:  General.      SURGEON:  Luda Reyes MD      ASSISTANT:  RORY Lew      SECOND ASSISTANT:  Eliecer Dale MD      PREAMBLE:  Mr. Freeman presented with advanced degenerative changes of his left ankle.  He tried conservative management to no avail.  Informed consent was obtained for the above-mentioned procedures.      Two grams of Ancef was given prior to surgery.  He will receive 1 gram of Ancef every 8 hours for 24 hours.      DESCRIPTION OF PROCEDURE:  After adequate induction of a general anesthetic, the patient was positioned supine on the operating table.  The right leg was sterilely prepped and free draped in the usual fashion.  Tourniquet around the thigh was inflated to 300 mmHg.      A standard 15 cm midline incision was used anterior over the ankle.  The interval between extensor hallucis longus and tibialis anterior was used to expose the joint.  The osteophytes were removed.      The distal tibial cut was made 9 mm from the articular surface.  The bone was removed.      The central talar pin was placed and the chamfer cuts done for the talus.      With all the cuts made, a trial reduction was done and there was good stability with a size 2 long base tibia, size 3 talus and an 8 mm polyethylene spacer.      The trial components were removed.  The ankle was thoroughly rinsed.  The definitive components were then impacted in place, again with good stability and range of motion.      The tourniquet was deflated.  Hemostasis obtained.  The wound closed in layers.  A sterile dressing and a light compressive bandage were applied, followed by a short leg cast.   He tolerated the procedure well and was taken to the recovery room in satisfactory condition.      He can ambulate partial weightbearing with crutches.  Sutures will be removed in 2 weeks.         NICOLLE DUCKWORTH MD             D: 2021   T: 2021   MT: RAJAN      Name:     CHI LOPEZ   MRN:      [id number]        Account:        [id number]   :      1945           Procedure Date: 2021      Document: [id number]

## 2021-02-01 NOTE — PROGRESS NOTES
Arrived from Recovery room.  A&O, able to make needs known.   Oriented to room/unit.  Denies surgical pain, ice pack applied.  Ortho Stoplight Tool discussed w/ pt.

## 2021-02-01 NOTE — BRIEF OP NOTE
[facility]    Brief Operative Note    Pre-operative diagnosis: DJD (degenerative joint disease), ankle and foot, left [M19.072]  Post-operative diagnosis R ankle arthritis    Procedure: Procedure(s):  LEFT TOTAL ANKLE  Surgeon: Surgeon(s) and Role:     * Luda Reyes MD - Primary     * Chance Santillan PA-C - Assisting  Anesthesia: General   Estimated blood loss: Less than 50 ml  Drains: None  Specimens: * No specimens in log *  Findings:   Expected.  Complications: None.  Implants:   Implant Name Type Inv. Item Serial No.  Lot No. LRB No. Used Action   IMP INSERT TORNIER TIBIAL ANKLE SIZE 2X8MM LT [id number] Total Joint Component/Insert IMP INSERT TORNIER TIBIAL ANKLE SIZE 2X8MM LT [id number]  [employer] [id number] Left 1 Implanted   IMP COMP TORNIER TALAR ANKLE SIZE 2 LT [id number] Total Joint Component/Insert IMP COMP TORNIER TALAR ANKLE SIZE 2 LT [id number]  [employer] [id number] Left 1 Implanted   IMP BASEPLATE TIBIAL ANKLE XL SIZE 2 VANESA [id number] Metallic Hardware/[place] IMP BASEPLATE TIBIAL ANKLE XL SIZE 2 VANESA [id number]  [employer] [id number] Left 1 Implanted

## 2021-02-02 ENCOUNTER — APPOINTMENT (OUTPATIENT)
Dept: PHYSICAL THERAPY | Facility: CLINIC | Age: 76
End: 2021-02-02
Attending: ORTHOPAEDIC SURGERY
Payer: MEDICARE

## 2021-02-02 VITALS
RESPIRATION RATE: 16 BRPM | BODY MASS INDEX: 24.58 KG/M2 | SYSTOLIC BLOOD PRESSURE: 131 MMHG | WEIGHT: 171.7 LBS | TEMPERATURE: 97.5 F | DIASTOLIC BLOOD PRESSURE: 72 MMHG | OXYGEN SATURATION: 95 % | HEART RATE: 71 BPM | HEIGHT: 70 IN

## 2021-02-02 LAB — GLUCOSE BLDC GLUCOMTR-MCNC: 98 MG/DL (ref 70–99)

## 2021-02-02 PROCEDURE — 250N000013 HC RX MED GY IP 250 OP 250 PS 637: Mod: GY | Performed by: PHYSICIAN ASSISTANT

## 2021-02-02 PROCEDURE — 999N001017 HC STATISTIC GLUCOSE BY METER IP

## 2021-02-02 PROCEDURE — 97530 THERAPEUTIC ACTIVITIES: CPT | Mod: GP | Performed by: PHYSICAL THERAPIST

## 2021-02-02 PROCEDURE — 250N000011 HC RX IP 250 OP 636: Performed by: PHYSICIAN ASSISTANT

## 2021-02-02 PROCEDURE — 97161 PT EVAL LOW COMPLEX 20 MIN: CPT | Mod: GP | Performed by: PHYSICAL THERAPIST

## 2021-02-02 PROCEDURE — 97116 GAIT TRAINING THERAPY: CPT | Mod: GP | Performed by: PHYSICAL THERAPIST

## 2021-02-02 RX ORDER — ONDANSETRON 4 MG/1
4 TABLET, ORALLY DISINTEGRATING ORAL EVERY 6 HOURS PRN
Qty: 12 TABLET | Refills: 0 | Status: SHIPPED | OUTPATIENT
Start: 2021-02-02

## 2021-02-02 RX ORDER — OXYCODONE HYDROCHLORIDE 5 MG/1
5-10 TABLET ORAL EVERY 4 HOURS PRN
Qty: 30 TABLET | Refills: 0 | Status: SHIPPED | OUTPATIENT
Start: 2021-02-02

## 2021-02-02 RX ORDER — SENNOSIDES A AND B 8.6 MG/1
1 TABLET, FILM COATED ORAL 2 TIMES DAILY PRN
Qty: 40 TABLET | Refills: 1 | Status: SHIPPED | OUTPATIENT
Start: 2021-02-02

## 2021-02-02 RX ORDER — ASPIRIN 325 MG
325 TABLET ORAL DAILY
Qty: 30 TABLET | Refills: 0 | Status: SHIPPED | OUTPATIENT
Start: 2021-02-02

## 2021-02-02 RX ADMIN — VENLAFAXINE HYDROCHLORIDE 75 MG: 75 CAPSULE, EXTENDED RELEASE ORAL at 08:46

## 2021-02-02 RX ADMIN — BUDESONIDE 9 MG: 3 CAPSULE ORAL at 08:45

## 2021-02-02 RX ADMIN — CEFAZOLIN 1 G: 1 INJECTION, POWDER, FOR SOLUTION INTRAMUSCULAR; INTRAVENOUS at 01:29

## 2021-02-02 RX ADMIN — KETOROLAC TROMETHAMINE 15 MG: 15 INJECTION, SOLUTION INTRAMUSCULAR; INTRAVENOUS at 01:29

## 2021-02-02 RX ADMIN — ESCITALOPRAM OXALATE 10 MG: 10 TABLET ORAL at 08:46

## 2021-02-02 RX ADMIN — KETOROLAC TROMETHAMINE 15 MG: 15 INJECTION, SOLUTION INTRAMUSCULAR; INTRAVENOUS at 08:45

## 2021-02-02 RX ADMIN — ASPIRIN 325 MG ORAL TABLET 325 MG: 325 PILL ORAL at 08:46

## 2021-02-02 RX ADMIN — LORAZEPAM 1 MG: 1 TABLET ORAL at 08:46

## 2021-02-02 RX ADMIN — CEFAZOLIN 1 G: 1 INJECTION, POWDER, FOR SOLUTION INTRAMUSCULAR; INTRAVENOUS at 08:52

## 2021-02-02 NOTE — CONSULTS
Care Management Initial Consult    General Information  Assessment completed with: Patient, Children, Jack, and Michael  Type of CM/SW Visit: Initial Assessment    Primary Care Provider verified and updated as needed: Yes   Readmission within the last 30 days: no previous admission in last 30 days      Reason for Consult: discharge planning  Advance Care Planning: Advance Care Planning Reviewed: no concerns identified          Communication Assessment  Patient's communication style: spoken language (English or Bilingual)    Hearing Difficulty or Deaf: no   Wear Glasses or Blind: yes    Cognitive  Cognitive/Neuro/Behavioral: WDL                      Living Environment:   People in home: spouse     Current living Arrangements: house      Able to return to prior arrangements: yes  Living Arrangement Comments: I spoke with the Jack's son Michael who has been staying with his mother while the pt has been in the hospital.  Michael stated his mother is 95% recovered from a brain shunt she had placed 2 years ago and he is pleasantly suprised to see how well she is doing.      Family/Social Support:  Care provided by: self  Provides care for: spouse  Marital Status:   Wife, Children          Description of Support System: Supportive, Involved         Current Resources:   Patient receiving home care services: No     Community Resources: None  Equipment currently used at home: none  Supplies currently used at home: None    Employment/Financial:  Employment Status: retired        Financial Concerns: No concerns identified           Lifestyle & Psychosocial Needs:        Socioeconomic History     Marital status:      Spouse name: Not on file     Number of children: Not on file     Years of education: Not on file     Highest education level: Not on file     Tobacco Use     Smoking status: Never Smoker     Smokeless tobacco: Never Used   Substance and Sexual Activity     Alcohol use: Yes     Drinks per session: 3 or 4     Binge  frequency: Weekly     Drug use: Never       Functional Status:  Prior to admission patient needed assistance:              Mental Health Status:  Mental Health Status: No Current Concerns       Chemical Dependency Status:  Chemical Dependency Status: No Current Concerns             Values/Beliefs:  Spiritual, Cultural Beliefs, Confucianist Practices, Values that affect care: no            Care Management Discharge Note    Discharge Date: 02/02/21       Discharge Disposition: Home Care    Discharge Services: None    Discharge DME: Other (see comment)(Per [facility] discharge orders)    Discharge Transportation: family or friend will provide    Private pay costs discussed: Not applicable    PAS Confirmation Code:  NA  Patient/family educated on Medicare website which has current facility and service quality ratings: yes    Education Provided on the Discharge Plan:  yes  Persons Notified of Discharge Plans: Pt, Bedside nurse, and Son Michael  Patient/Family in Agreement with the Plan: yes    Handoff Referral Completed: Yes pt's MD is out of sytem    Additional Information:  The pt's nurse and PT are concerned regarding Fredrick following his weight bearing restrictions post op.  The pt apparantly keeps getting up and bearing full weight on his LLE versus 50% as he has been instructed to.  I spoke with Fredrick and he is agreeable to the home PT recommended and he is agreeable to me speaking with his son Michael for discharge planning.  Fredrick's nurse is concern due to the pt being his wife's caregiver post brain surgery.    I spoke with the Fredrick's son Michael who has been staying with his mother while the pt has been in the hospital.  Michael stated his mother is 95% recovered from a brain shunt she had placed 2 years ago and he is pleasantly suprised to see how well she is doing.  Michael did admit his father can be forgetful and if he is reminded frequently of something it will eventually get into his long term memory.  Michael stated he will come into the  hospital to receive discharge instructions with his father so her can remind his father correctly regarding his weight bearing restrictions.  Michael stated his mother has the mental capacity to remind the pt of his restrictions too and he will educate her what they are.      I did home care choices with Fredrick:  Pt/family was given the Medicare Compare List for Home Care, with associated star ratings to assist with choices for referrals/discharge planning Yes  Education was given to pt/family that star ratings are updated/maintained by Medicare and can be reviewed by visiting www.medicare.gov Yes    His first pick for home care PT was [facility] due to his PCP being with [facility] and having used them for his wife post op two years ago.  I spoke with [facility] intake, [phone], and they are not able to accept the pt due to lack of capacity.  The pt's second choice for home care is [facility] and I sent an email referral to Yohana Hawthorne and the department. I just now received an email confirming [facility] will be able to see this pt for home PT within 24-48 hours.    The pt has already discharged at this time and I called his son and give him the information as we had planned (knowing the pt would be discharging shortly after PT worked with him).  I also spoke with the pt's wife and gave her [facility] information:      NOTE:  Your doctor has ordered home care to help you after your hospital stay. The staff will contact you to schedule your first visit. This service will be provided by [facility]. If you have any question, or have not received a call within 48 hours of discharge, please call them at [phone] or [phone].  *please see homecare quality ratings for all homecares in your area at www.medicare.gov        LAURA Milian RN, BSN Care Coordinator  [facility]  Mobile: [phone]

## 2021-02-02 NOTE — PROGRESS NOTES
Kevin Freeman  2021  POD #1 L TAA    Doing well.  No immediate surgical complications identified.  No excessive bleeding  Temperatures:  Current - Temp: 98  F (36.7  C); Max - Temp  Av.8  F (36.6  C)  Min: 96.3  F (35.7  C)  Max: 98.4  F (36.9  C)  Pulse range: Pulse  Av.3  Min: 66  Max: 77  Blood pressure range: Systolic (24hrs), Av , Min:113 , Max:172   ; Diastolic (24hrs), Av, Min:66, Max:95    CMS: block still intact to L LE  Labs:none    PLAN:PT eval today, plan discharge home later today.

## 2021-02-02 NOTE — PLAN OF CARE
Physical Therapy Discharge Summary    Reason for therapy discharge:    Discharged to home with home therapy.    Progress towards therapy goal(s). See goals on Care Plan in [facility] electronic health record for goal details.  Goals not met.  Barriers to achieving goals:   discharge from facility.    Therapy recommendation(s):    Continued therapy is recommended.  Rationale/Recommendations:  Pt will benefit from Home PT in order to progress independence and safety with functional mobility.

## 2021-02-02 NOTE — PLAN OF CARE
Summary: Patient up in room. Adequate I/O. Denies pain. Absent sensation left foot.   Orientation: A & O x 4, impulsive.  VS/ O2/ IV: VSS on RA. SL  Mobility: A 1 w/ 2ww. 50% WB to LLE.  Pain Management: Denies. Scheduled Toradol given.  Diet: Regular  Bowel/ Bladder: Continent. Adequate output, passing flatus.  Skin: LLE casted. Dressing to left knee with dried drainage  CMS: Absent sensation left extremity, can feel when writer touches foot.  Consults/ Providers: Elaine  Discharge/ Plan: Discharge home this afternoon after PT session with referral to

## 2021-02-02 NOTE — PLAN OF CARE
.A&Ox4. Assist Bedrest overnight but dangled on side of bed. VSS on RA. CMS intact. Dressing C/D/I. Denies pain. Voiding well in urinal. Tolerating Reg Diet. Progressing per POC. Will Cont to monitor.

## 2021-02-02 NOTE — PROGRESS NOTES
Writer spoke with RORY Murcia regarding at home PT for patient at the suggestion of P.T. P.T will see patient this afternoon.

## 2021-02-02 NOTE — PROGRESS NOTES
Patient vital signs are at baseline: Yes  Patient able to ambulate as they were prior to admission or with assist devices provided by therapies during their stay:  No,  Reason:  PT scheduled tomorrow, dangled in bed  Patient MUST void prior to discharge:  Yes  Patient able to tolerate oral intake:  Yes  Pain has adequate pain control using Oral analgesics:  Denies pain.

## 2021-02-02 NOTE — PROGRESS NOTES
[facility]  Spoke with patient to discuss plans for HC. Patient to be discharged home today and has agreed to have [facility] follow with PT. Patient care support center processing referral. Patient verbalized understanding that initial visit is scheduled for 2/3 or 2/4. Patient has 24 hour phone number for [facility] for any questions or concerns.    Addie Marquis RN   [facility] Care Liaison   [phone]

## 2021-02-02 NOTE — PROGRESS NOTES
02/02/21 0957   Quick Adds   Type of Visit Initial PT Evaluation   Living Environment   People in home spouse   Current Living Arrangements house   Home Accessibility stairs to enter home   Number of Stairs, Main Entrance 2   Stair Railings, Main Entrance none   Transportation Anticipated car, drives self   Living Environment Comments Pt reports his spouse isn't able to provide assist. Pt is the main caregiver for his wife as she uses a cane and impaired balance. Pt reports his son and brothers would be able to assist.    Self-Care   Usual Activity Tolerance good   Current Activity Tolerance moderate   Equipment Currently Used at Home none   Activity/Exercise/Self-Care Comment Pt owns FWW, 4WW and crutches.    Disability/Function   Fall history within last six months yes   Number of times patient has fallen within last six months 6   General Information   Onset of Illness/Injury or Date of Surgery 02/01/21   Referring Physician Luda Reyes MD   Patient/Family Therapy Goals Statement (PT) Return home.    Pertinent History of Current Problem (include personal factors and/or comorbidities that impact the POC) 74 y/o male POD # 1 L TAA.    Existing Precautions/Restrictions fall   Weight-Bearing Status - LLE partial weight-bearing (% in comments)  (50% flatfoot with no push off)   General Observations Pt in supine upon arrival of therapist.    Cognition   Orientation Status (Cognition) oriented x 3   Affect/Mental Status (Cognition) WFL   Follows Commands (Cognition) 50-74% accuracy  (Impulsive, impaired safety awareness)   Pain Assessment   Patient Currently in Pain No   Integumentary/Edema   Integumentary/Edema Comments L ankle covered with splint and ace bandage.   Posture    Posture Comments Noted forward head and shoulder posture upon sitting EOB and standing with FWW.   Range of Motion (ROM)   ROM Comment Limited L ankle ROM d/t splint, otherwise B LEs WFL.    Strength   Strength Comments Not formally  assessed, pt demonstrates at least 3/5 grossly in B LEs with functional mobility.    Bed Mobility   Comment (Bed Mobility) Supine-sit independently.   Transfers   Transfer Safety Comments Sit <> stand with FWW and CGA.    Gait/Stairs (Locomotion)   Comment (Gait/Stairs) Pt amb 10' with FWW and CGA.    Balance   Balance Comments Noted good seated and standing balance at EOB with use of FWW.    Sensory Examination   Sensory Perception Comments Pt reports numbness in L foot.    Clinical Impression   Criteria for Skilled Therapeutic Intervention yes, treatment indicated   PT Diagnosis (PT) Difficulty with functional mobility.    Influenced by the following impairments Pain, Generalized weakness, Decreased activity tolerance, Impaired balance   Functional limitations due to impairments Limited functional mobility requiring AD and assist.    Clinical Presentation Stable/Uncomplicated   Clinical Presentation Rationale Based on PMH, current presentation, and social support.    Clinical Decision Making (Complexity) low complexity   Therapy Frequency (PT) 2x/day   Predicted Duration of Therapy Intervention (days/wks) 1 day   Planned Therapy Interventions (PT) balance training;bed mobility training;cryotherapy;gait training;stair training;transfer training   Risk & Benefits of therapy have been explained patient   PT Discharge Planning    PT Discharge Recommendation (DC Rec) home with assist;home with home care physical therapy   PT Rationale for DC Rec Anticipate pt will continue to progress towards independence in order to safely discharge home with assist. RN discussed with Ortho MD/PA and they are in agreement for pt to have Home PT upon discharge. Recommend pt has assist to navigate stairs and initially with mobility and to decrease care burden on patient to assist his wife.    PT Brief overview of current status  Pt is currently requiring CGA-Norberto for functional mobility. Pt requires continuous cues for sequencing and  maintaing weightbearing restriction.    Total Evaluation Time   Total Evaluation Time (Minutes) 5

## 2021-02-02 NOTE — PLAN OF CARE
[facility]      OUTPATIENT PHYSICAL THERAPY EVALUATION  PLAN OF TREATMENT FOR OUTPATIENT REHABILITATION  (COMPLETE FOR INITIAL CLAIMS ONLY)  Patient's Last Name, First Name, M.I.  YOB: 1945  Kevin Freeman                        Provider's Name  [facility] Medical Record No.  [id number]                               Onset Date:  02/01/21   Start of Care Date:  02/02/21      Type:     _X_PT   ___OT   ___SLP Medical Diagnosis:  L TAA                        PT Diagnosis:  Difficulty with functional mobility.    Visits from SOC:  1   _________________________________________________________________________________  Plan of Treatment/Functional Goals    Planned Interventions: balance training, bed mobility training, cryotherapy, gait training, stair training, transfer training     Goals: See Physical Therapy Goals on Care Plan in [facility] electronic health record.    Therapy Frequency: 2x/day  Predicted Duration of Therapy Intervention: 1 day  _________________________________________________________________________________    I CERTIFY THE NEED FOR THESE SERVICES FURNISHED UNDER        THIS PLAN OF TREATMENT AND WHILE UNDER MY CARE     (Physician co-signature of this document indicates review and certification of the therapy plan).              Certification date from: 02/02/21, Certification date to: 02/01/21    Referring Physician: Luda Reyes MD            Initial Assessment        See Physical Therapy evaluation dated 02/02/21 in Epic electronic health record.

## 2021-02-02 NOTE — PLAN OF CARE
VSS on RA, CMS intact ex numb LLE, dressing CDI. Went over discharge instruction and medication with pt and son Mario. Pt was very adamant and in hurry to discharge.  Educated and reinforced on weightbearing status. All questions answered. Returned belongings. Pt discharged with son.     1626: called Son Mario regarding pt's IV that is still on pt's arm, asked to have pt come back so RN can take the IV out. Mario and Kevin both said they will take it out themselves and insisted on being able to do it themselves. RN educated on ensuring doing it safely to prevent any harm.

## 2021-02-18 ENCOUNTER — AMBULATORY - HEALTHEAST (OUTPATIENT)
Dept: OTHER | Facility: CLINIC | Age: 76
End: 2021-02-18

## 2021-02-18 ENCOUNTER — DOCUMENTATION ONLY (OUTPATIENT)
Dept: OTHER | Facility: CLINIC | Age: 76
End: 2021-02-18

## 2021-02-25 ENCOUNTER — MEDICAL CORRESPONDENCE (OUTPATIENT)
Dept: HEALTH INFORMATION MANAGEMENT | Facility: CLINIC | Age: 76
End: 2021-02-25

## 2021-02-25 DIAGNOSIS — Z53.9 DIAGNOSIS NOT YET DEFINED: Primary | ICD-10-CM

## 2021-05-28 ENCOUNTER — RECORDS - HEALTHEAST (OUTPATIENT)
Dept: ADMINISTRATIVE | Facility: OTHER | Age: 76
End: 2021-05-28

## 2021-06-08 ENCOUNTER — RECORDS - HEALTHEAST (OUTPATIENT)
Dept: ADMINISTRATIVE | Facility: OTHER | Age: 76
End: 2021-06-08

## 2021-07-01 ENCOUNTER — AMBULATORY - HEALTHEAST (OUTPATIENT)
Dept: CARDIAC REHAB | Facility: HOSPITAL | Age: 76
End: 2021-07-01

## 2021-07-01 DIAGNOSIS — I21.4 NSTEMI (NON-ST ELEVATED MYOCARDIAL INFARCTION) (H): ICD-10-CM

## 2021-07-01 DIAGNOSIS — Z95.5 STENTED CORONARY ARTERY: ICD-10-CM

## 2021-07-22 ENCOUNTER — HOSPITAL ENCOUNTER (OUTPATIENT)
Dept: CARDIAC REHAB | Facility: HOSPITAL | Age: 76
End: 2021-07-22
Attending: INTERNAL MEDICINE
Payer: MEDICARE

## 2021-07-22 PROCEDURE — 93798 PHYS/QHP OP CAR RHAB W/ECG: CPT

## 2021-07-27 ENCOUNTER — HOSPITAL ENCOUNTER (OUTPATIENT)
Dept: CARDIAC REHAB | Facility: HOSPITAL | Age: 76
End: 2021-07-27
Attending: INTERNAL MEDICINE
Payer: MEDICARE

## 2021-07-27 PROCEDURE — 93798 PHYS/QHP OP CAR RHAB W/ECG: CPT

## 2021-07-29 ENCOUNTER — HOSPITAL ENCOUNTER (OUTPATIENT)
Dept: CARDIAC REHAB | Facility: HOSPITAL | Age: 76
End: 2021-07-29
Attending: INTERNAL MEDICINE
Payer: MEDICARE

## 2021-07-29 PROCEDURE — 93798 PHYS/QHP OP CAR RHAB W/ECG: CPT

## 2021-08-03 ENCOUNTER — HOSPITAL ENCOUNTER (OUTPATIENT)
Dept: CARDIAC REHAB | Facility: HOSPITAL | Age: 76
End: 2021-08-03
Attending: INTERNAL MEDICINE
Payer: MEDICARE

## 2021-08-03 PROCEDURE — 93798 PHYS/QHP OP CAR RHAB W/ECG: CPT

## 2021-08-05 ENCOUNTER — HOSPITAL ENCOUNTER (OUTPATIENT)
Dept: CARDIAC REHAB | Facility: HOSPITAL | Age: 76
End: 2021-08-05
Attending: INTERNAL MEDICINE
Payer: MEDICARE

## 2021-08-05 PROCEDURE — 93798 PHYS/QHP OP CAR RHAB W/ECG: CPT

## 2021-08-10 ENCOUNTER — HOSPITAL ENCOUNTER (OUTPATIENT)
Dept: CARDIAC REHAB | Facility: HOSPITAL | Age: 76
End: 2021-08-10
Attending: INTERNAL MEDICINE
Payer: MEDICARE

## 2021-08-10 PROCEDURE — 93798 PHYS/QHP OP CAR RHAB W/ECG: CPT

## 2021-08-12 ENCOUNTER — HOSPITAL ENCOUNTER (OUTPATIENT)
Dept: CARDIAC REHAB | Facility: HOSPITAL | Age: 76
End: 2021-08-12
Attending: INTERNAL MEDICINE
Payer: MEDICARE

## 2021-08-12 PROCEDURE — 93798 PHYS/QHP OP CAR RHAB W/ECG: CPT

## 2021-09-07 ENCOUNTER — HOSPITAL ENCOUNTER (OUTPATIENT)
Dept: CARDIAC REHAB | Facility: HOSPITAL | Age: 76
End: 2021-09-07
Attending: INTERNAL MEDICINE
Payer: MEDICARE

## 2021-09-07 PROCEDURE — 93798 PHYS/QHP OP CAR RHAB W/ECG: CPT

## (undated) DEVICE — GLOVE PROTEXIS MICRO 8.5  2D73PM85

## (undated) DEVICE — SPONGE LAP 18X18" X8435

## (undated) DEVICE — STPL SKIN 35W ROTATING HEAD PRW35

## (undated) DEVICE — SU VICRYL 2-0 CT-2 27" UND J269H

## (undated) DEVICE — BLADE SAW LG BONE TORNIER 70X13X1.27MM SAW6944T

## (undated) DEVICE — SU VICRYL 3-0 PS-1 18" UND J683

## (undated) DEVICE — PREP DURAPREP 26ML APL 8630

## (undated) DEVICE — ESU PENCIL W/HOLSTER E2350H

## (undated) DEVICE — CAST PLASTER SPLINT 5X30" 7395

## (undated) DEVICE — DRSG ABDOMINAL 07 1/2X8" 7197D

## (undated) DEVICE — DRAPE SHEET REV FOLD 3/4 9349

## (undated) DEVICE — DRAPE COVER C-ARM SEAMLESS SNAP-KAP 03-KP26 LATEX FREE

## (undated) DEVICE — BLADE KNIFE SURG 15 371115

## (undated) DEVICE — SOL WATER IRRIG 1000ML BOTTLE 2F7114

## (undated) DEVICE — BLADE SAW RECIP STRK 70X6X0.025MM 0277-096-250S5

## (undated) DEVICE — PACK EXTREMITY SOP15EXFSD

## (undated) DEVICE — BNDG ELASTIC 4" DBL LENGTH UNSTERILE 6611-14

## (undated) DEVICE — GLOVE PROTEXIS W/NEU-THERA 8.5  2D73TE85

## (undated) DEVICE — GLOVE PROTEXIS W/NEU-THERA 8.0  2D73TE80

## (undated) DEVICE — SET OF 12 PINS AND 1 REAMER

## (undated) DEVICE — IMM LIMB ELEVATOR DC40-0203

## (undated) DEVICE — LINEN TOWEL PACK X5 5464

## (undated) DEVICE — SOL NACL 0.9% IRRIG 1000ML BOTTLE 2F7124

## (undated) DEVICE — DRSG GAUZE 4X4" 3033

## (undated) DEVICE — GLOVE PROTEXIS W/NEU-THERA 7.5  2D73TE75

## (undated) DEVICE — CAST PADDING 4" UNSTERILE 9044

## (undated) DEVICE — SUCTION CANISTER MEDIVAC LINER 3000ML W/LID 65651-530

## (undated) DEVICE — GOWN XXLG REINFORCED 9071EL

## (undated) RX ORDER — FENTANYL CITRATE 50 UG/ML
INJECTION, SOLUTION INTRAMUSCULAR; INTRAVENOUS
Status: DISPENSED
Start: 2021-02-01

## (undated) RX ORDER — CEFAZOLIN SODIUM 2 G/100ML
INJECTION, SOLUTION INTRAVENOUS
Status: DISPENSED
Start: 2021-02-01

## (undated) RX ORDER — FENTANYL CITRATE 0.05 MG/ML
INJECTION, SOLUTION INTRAMUSCULAR; INTRAVENOUS
Status: DISPENSED
Start: 2021-02-01

## (undated) RX ORDER — DEXAMETHASONE SODIUM PHOSPHATE 4 MG/ML
INJECTION, SOLUTION INTRA-ARTICULAR; INTRALESIONAL; INTRAMUSCULAR; INTRAVENOUS; SOFT TISSUE
Status: DISPENSED
Start: 2021-02-01

## (undated) RX ORDER — ONDANSETRON 2 MG/ML
INJECTION INTRAMUSCULAR; INTRAVENOUS
Status: DISPENSED
Start: 2021-02-01

## (undated) RX ORDER — PROPOFOL 10 MG/ML
INJECTION, EMULSION INTRAVENOUS
Status: DISPENSED
Start: 2021-02-01

## (undated) RX ORDER — LIDOCAINE HYDROCHLORIDE 20 MG/ML
INJECTION, SOLUTION EPIDURAL; INFILTRATION; INTRACAUDAL; PERINEURAL
Status: DISPENSED
Start: 2021-02-01